# Patient Record
Sex: MALE | URBAN - METROPOLITAN AREA
[De-identification: names, ages, dates, MRNs, and addresses within clinical notes are randomized per-mention and may not be internally consistent; named-entity substitution may affect disease eponyms.]

---

## 2024-03-19 ENCOUNTER — APPOINTMENT (RX ONLY)
Dept: URBAN - METROPOLITAN AREA CLINIC 156 | Facility: CLINIC | Age: 64
Setting detail: DERMATOLOGY
End: 2024-03-19

## 2024-03-19 VITALS — HEIGHT: 72 IN | WEIGHT: 175 LBS

## 2024-03-19 DIAGNOSIS — D17 BENIGN LIPOMATOUS NEOPLASM: ICD-10-CM | Status: INADEQUATELY CONTROLLED

## 2024-03-19 PROBLEM — D17.0 BENIGN LIPOMATOUS NEOPLASM OF SKIN AND SUBCUTANEOUS TISSUE OF HEAD, FACE AND NECK: Status: ACTIVE | Noted: 2024-03-19

## 2024-03-19 PROCEDURE — 99202 OFFICE O/P NEW SF 15 MIN: CPT

## 2024-03-19 PROCEDURE — ? PHOTO-DOCUMENTATION

## 2024-03-19 PROCEDURE — ? CONSULTATION FOR EXCISION

## 2024-03-19 PROCEDURE — ? COUNSELING

## 2024-03-19 PROCEDURE — ? ADDITIONAL NOTES

## 2024-03-19 ASSESSMENT — LOCATION DETAILED DESCRIPTION DERM: LOCATION DETAILED: LEFT SUPERIOR POSTERIOR NECK

## 2024-03-19 ASSESSMENT — LOCATION SIMPLE DESCRIPTION DERM: LOCATION SIMPLE: POSTERIOR NECK

## 2024-03-19 ASSESSMENT — LOCATION ZONE DERM: LOCATION ZONE: NECK

## 2024-03-19 NOTE — PROCEDURE: ADDITIONAL NOTES
Additional Notes: Excision scheduled 03/10 @ 7:10am
Detail Level: Simple
Render Risk Assessment In Note?: no